# Patient Record
Sex: MALE | Race: WHITE | NOT HISPANIC OR LATINO | Employment: OTHER | URBAN - NONMETROPOLITAN AREA
[De-identification: names, ages, dates, MRNs, and addresses within clinical notes are randomized per-mention and may not be internally consistent; named-entity substitution may affect disease eponyms.]

---

## 2024-02-16 ENCOUNTER — EVALUATION (OUTPATIENT)
Dept: PHYSICAL THERAPY | Facility: CLINIC | Age: 74
End: 2024-02-16
Payer: MEDICARE

## 2024-02-16 DIAGNOSIS — M25.551 HIP PAIN, ACUTE, RIGHT: Primary | ICD-10-CM

## 2024-02-16 DIAGNOSIS — M25.551 RIGHT HIP PAIN: ICD-10-CM

## 2024-02-16 PROCEDURE — 97110 THERAPEUTIC EXERCISES: CPT | Mod: GP | Performed by: PHYSICAL THERAPIST

## 2024-02-16 PROCEDURE — 97161 PT EVAL LOW COMPLEX 20 MIN: CPT | Mod: GP | Performed by: PHYSICAL THERAPIST

## 2024-02-16 ASSESSMENT — ENCOUNTER SYMPTOMS
DEPRESSION: 0
OCCASIONAL FEELINGS OF UNSTEADINESS: 0
LOSS OF SENSATION IN FEET: 0

## 2024-02-16 ASSESSMENT — PAIN SCALES - GENERAL: PAINLEVEL_OUTOF10: 6

## 2024-02-16 ASSESSMENT — PATIENT HEALTH QUESTIONNAIRE - PHQ9
SUM OF ALL RESPONSES TO PHQ9 QUESTIONS 1 AND 2: 0
1. LITTLE INTEREST OR PLEASURE IN DOING THINGS: NOT AT ALL
2. FEELING DOWN, DEPRESSED OR HOPELESS: NOT AT ALL

## 2024-02-16 ASSESSMENT — PAIN - FUNCTIONAL ASSESSMENT: PAIN_FUNCTIONAL_ASSESSMENT: 0-10

## 2024-02-16 NOTE — PROGRESS NOTES
Physical Therapy Evaluation and Treatment      Patient Name: Matt Lantigua  MRN: [id number]  Today's Date: 2/16/2024  Time Calculation  Start Time: 1000  Stop Time: 1040  Time Calculation (min): 40 min    PT Evaluation Time Entry  PT Evaluation (Low) Time Entry: 29   PT Therapeutic Procedures Time Entry  Therapeutic Exercise Time Entry: 10                         Assessment:  PT Assessment Results: Decreased strength, Decreased range of motion, Decreased mobility, Pain  Rehab Prognosis: Good  Barriers to Participation:  (None)    The pt presents with Medical Dx of R hip pain.   Pt presents with the following deficits: increased pain, decreased strength, ROM, flexibility, balance, gait and functional mobility.  Pt would benefit from PT services in order to improve on these deficits and maximize strength and ability for functional activity/mobility.        Plan:  Treatment/Interventions: Cryotherapy, Education/ Instruction, Hot pack, Manual therapy, Neuromuscular re-education, Therapeutic exercises  PT Plan: Skilled PT  PT Frequency:  (2x/wk for 4 weeks or 9 sessions)  Rehab Potential: Good  Plan of Care Agreement: Patient (Patient Goals:  Improve strength and ROM leading up to upcoming R hip surgery)    Current Problem:   Problem List Items Addressed This Visit             ICD-10-CM       Musculoskeletal and Injuries    Right hip pain M25.551    Relevant Orders    Follow Up In Physical Therapy    Hip pain, acute, right - Primary M25.551    Relevant Orders    Follow Up In Physical Therapy       Subjective   Pt has chronic hx of R hip pain and is to have upcoming R THR surgery in the near future.  Pt reports that R hip pain is worse with walking longer distances, pivoting or twisting on the R hip and getting up from a chair.  Pt reports that sitting and rest help with pain.  Medication or ice/heat does not really help.      General  Reason for Referral: R  hip  Referred By: Macrina Mills CNP  General Comment:  Visit #     Precautions:  Precautions  Precautions Comment: No Fall risk.  PMH:  hx of chext pain, MI, HTN, OA, hx of lung sx and cholecystectomy    Pain:  Pain Assessment  Pain Assessment: 0-10  Pain Score: 6 (R hip pain range 2-9/10)  Pain Type: Chronic pain  Pain Location: Hip  Pain Orientation: Right    Home Living:   Pt lives with his wife in a 1 story home.       Prior Level of Function:   Pt was I with all ADL's and IADL's prior.   Retired.        Objective   Observation:   Mild FHP with rounded shoulders.    Gait:  Antalgic gait with mildly slower lacie.      Palpation:   Tender R hip/greater trochanter.    Strength:                      R-   Hip flex   4-/5   Hip abd   4-/5    Hip ER   3+/5   Hip IR   3+/5   Knee flex   5/5   Knee ext   4-/5     L-   Hip flex   5/5   Hip abd   5/5    Hip ER   5/5   Hip IR   5/5   Knee flex   5/5   Knee ext   5/5    Hip PROM:                    R-   WNL Throughout.      L-   Hip flex 90  deg    Hip abd 30  deg    Hip ER 10  deg   Hip IR 10  deg    Flexibility:   Moderate tightness R HS, glut and piriformis muscles.                                                               Special Tests:  N/A    Outcome Measures:  Other Measures  Lower Extremity Funtional Score (LEFS): 66%     Treatments:  Ther Ex:  10 min   1 unit  Manual L  HS Stretch   20 sec hold x 3   PROM L Hip all planes  7 min  HEP instruction verbal on stretches.       OP EDUCATION:   PT edu pt regarding PT POC/course of treatment and HEP.  Pt verbalized good understanding.      Goals:  Active       PT Problem       PT Goal 1       Start:  24    Expected End:  24       LT) Improve R hip strength  to >= 4+/5 throughout in order to facilitate safe gait and mobility.       4-6 weeks      2) Improve R hip PROM   to >= 105 deg flex and 20 deg ER and IR in order to facilitate safe gait and stair negotiation.       4-6 weeks        3) Improve  hip pain from   9/10 to <= 3/10 with activity in  order to improve QOL.      4-6 weeks      4) Improve LEFS score by >= 5 points in order to improve QOL.    4-6 weeks      5) Pt will be able to walk longer distances, transition sit to stand from chair, negotiate stairs and return to exercise, or work around the home without significant limitation.      4-6 weeks      ST) Pt/caregiver will be I and consistent with HEP with use of handout as needed in order to maximize strength and flexibility.      2-3 weeks

## 2024-03-01 ENCOUNTER — TREATMENT (OUTPATIENT)
Dept: PHYSICAL THERAPY | Facility: CLINIC | Age: 74
End: 2024-03-01
Payer: MEDICARE

## 2024-03-01 DIAGNOSIS — M25.551 RIGHT HIP PAIN: ICD-10-CM

## 2024-03-01 DIAGNOSIS — M25.551 HIP PAIN, ACUTE, RIGHT: ICD-10-CM

## 2024-03-01 PROCEDURE — 97110 THERAPEUTIC EXERCISES: CPT | Mod: GP,CQ

## 2024-03-01 ASSESSMENT — PAIN - FUNCTIONAL ASSESSMENT: PAIN_FUNCTIONAL_ASSESSMENT: 0-10

## 2024-03-01 ASSESSMENT — PAIN SCALES - GENERAL: PAINLEVEL_OUTOF10: 4

## 2024-03-01 NOTE — PROGRESS NOTES
Physical Therapy Treatment    Patient Name: Matt Lantigua  MRN: [id number]  Today's Date: 3/1/2024  Time Calculation  Start Time: 0735  Stop Time: 0819  Time Calculation (min): 44 min  PT Therapeutic Procedures Time Entry  Therapeutic Exercise Time Entry: 42       Assessment:   Patient identified by name and date of birth. Patient was able to tolerate progression of strengthening with mild discomfort noted throughout. He required minimal cues for form and technique. Added seated SLR for increased I with SLR with cues to maintain quad set he was able to preform with mild difficulty.     Plan:  OP PT Plan  Treatment/Interventions: Cryotherapy, Education/ Instruction, Hot pack, Manual therapy, Neuromuscular re-education, Therapeutic exercises  PT Plan: Skilled PT  PT Frequency:  (2x/wk for 4 weeks or 9 sessions)  Rehab Potential: Good  Plan of Care Agreement: Patient (Patient Goals:  Improve strength and ROM leading up to upcoming R hip surgery)  Continue with progression of strengthening and ROM for increased ease with ambulation.   Current Problem  Problem List Items Addressed This Visit             ICD-10-CM    Right hip pain M25.551    Hip pain, acute, right M25.551       Subjective Patient reported compliance with % of the time and verbalized understanding. He reported he required the assist of his wife to perform supine SLR. He reported 2.5-3/10 pain after treatment.     General  Reason for Referral: R  hip  Referred By: Macrina Mills CNP  General Comment: Visit # 2/9    Precautions  Precautions  Precautions Comment: No Fall risk.  PMH:  hx of chext pain, MI, HTN, OA, hx of lung sx and cholecystectomy    Pain  Pain Assessment: 0-10  Pain Score: 4  Pain Type: Chronic pain  Pain Location: Hip  Pain Orientation: Right     Treatments:  Therapeutic Exercise  Therapeutic Exercise Performed: Yes  Seated stepper 5'   Slant board 2 x 1'  Standing hip abd 2 x 10  Standing ext 2 x 10  Standing hip flexion 2 x  "10  Standing heel raises 2 x 10   Standing toe raises 2 x 10   Mini Squats 2 x 10   Seated marching 2 x 10   Seated hip abd 2 x 10 Green band   Seated hip add 2 x 10 3\" hold   LAQ 2 x 10 3\" hold   HSC 2 x 10 Green   Seated SLR 2 x 10   Heel slide with strap x10      OP EDUCATION:   Access Code: [email or web address]  URL: [email or web address]  Date: 2024  Prepared by: Lesia Barros    Exercises  - Supine Bridge  - 1 x daily - 7 x weekly - 2 sets - 10 reps - 3-5 hold  - Supine Heel Slide with Strap  - 1 x daily - 7 x weekly - 1 sets - 10 reps  - Standing Hip Abduction with Counter Support  - 1 x daily - 7 x weekly - 2 sets - 10 reps  - Standing Hip Extension with Counter Support  - 1 x daily - 7 x weekly - 2 sets - 10 reps  - Standing Hip Flexion AROM  - 1 x daily - 7 x weekly - 2 sets - 10 reps  - Mini Squat with Counter Support  - 1 x daily - 7 x weekly - 2 sets - 10 reps  - Heel Raises with Counter Support  - 1 x daily - 7 x weekly - 2 sets - 10 reps  - Standing Toe Raises at Chair  - 1 x daily - 7 x weekly - 2 sets - 10 reps  - Seated Hip Abduction with Resistance  - 1 x daily - 7 x weekly - 2 sets - 10 reps  - Seated Long Arc Quad  - 1 x daily - 7 x weekly - 2 sets - 10 reps - 3-5 hold  - Seated Hip Adduction Isometrics with Ball  - 1 x daily - 7 x weekly - 2 sets - 10 reps - 3-5 hold    Goals:  Active       PT Problem       PT Goal 1       Start:  24    Expected End:  24       LT) Improve R hip strength  to >= 4+/5 throughout in order to facilitate safe gait and mobility.       4-6 weeks      2) Improve R hip PROM   to >= 105 deg flex and 20 deg ER and IR in order to facilitate safe gait and stair negotiation.       4-6 weeks        3) Improve  hip pain from   9/10 to <= 3/10 with activity in order to improve QOL.      4-6 weeks      4) Improve LEFS score by >= 5 points in order to improve QOL.    4-6 weeks      5) Pt will be able to walk longer distances, transition sit to " stand from chair, negotiate stairs and return to exercise, or work around the home without significant limitation.      4-6 weeks      ST) Pt/caregiver will be I and consistent with HEP with use of handout as needed in order to maximize strength and flexibility.      2-3 weeks

## 2024-03-06 ENCOUNTER — TREATMENT (OUTPATIENT)
Dept: PHYSICAL THERAPY | Facility: CLINIC | Age: 74
End: 2024-03-06
Payer: MEDICARE

## 2024-03-06 DIAGNOSIS — M25.551 RIGHT HIP PAIN: ICD-10-CM

## 2024-03-06 DIAGNOSIS — M25.551 HIP PAIN, ACUTE, RIGHT: ICD-10-CM

## 2024-03-06 PROCEDURE — 97110 THERAPEUTIC EXERCISES: CPT | Mod: GP,CQ

## 2024-03-06 ASSESSMENT — PAIN - FUNCTIONAL ASSESSMENT: PAIN_FUNCTIONAL_ASSESSMENT: 0-10

## 2024-03-06 ASSESSMENT — PAIN SCALES - GENERAL: PAINLEVEL_OUTOF10: 3

## 2024-03-06 NOTE — PROGRESS NOTES
Physical Therapy Treatment    Patient Name: Matt Lantigua  MRN: [id number]  Today's Date: 3/6/2024  Time Calculation  Start Time: 1048  Stop Time: 1130  Time Calculation (min): 42 min  PT Therapeutic Procedures Time Entry  Therapeutic Exercise Time Entry: 40       Assessment:   Patient identified by name and date of birth. Patient demonstrated difficulty with wb on R LE this date. Reviewed preforming exercises in small range to decrease with reports of increased pain/soreness.     Plan:  OP PT Plan  Treatment/Interventions: Cryotherapy, Education/ Instruction, Hot pack, Manual therapy, Neuromuscular re-education, Therapeutic exercises  PT Plan: Skilled PT  PT Frequency:  (2x/wk for 4 weeks or 9 sessions)  Rehab Potential: Good  Plan of Care Agreement: Patient (Patient Goals:  Improve strength and ROM leading up to upcoming R hip surgery)  Continue with ROM and strengthening for reduction of Sx and prep for upcoming surgery.   Current Problem  Problem List Items Addressed This Visit             ICD-10-CM    Right hip pain M25.551    Hip pain, acute, right M25.551       Subjective Patient reported compliance with % of the time and verbalized understanding.  He reported increased pain and soreness since last treatment. He reported 2/10 pain after treatment.  General  Reason for Referral: R  hip  Referred By: Macrina Mills CNP  General Comment: Visit # 3/9  Precautions  Precautions  Precautions Comment: No Fall risk.  PMH:  hx of chext pain, MI, HTN, OA, hx of lung sx and cholecystectomy    Pain  Pain Assessment: 0-10  Pain Score: 3  Pain Type: Chronic pain  Pain Location: Hip  Pain Orientation: Right     Treatments:  Therapeutic Exercise  Therapeutic Exercise Performed: Yes  Slant board 2 x 1'  Step up (A)  Standing hip abd 2 x 10  Standing ext 2 x 10  Standing hip flexion 2 x 10  Standing heel raises 2 x 10   Standing toe raises 2 x 10   Mini Squats 2 x 10   Seated marching  x 10   Seated hip abd 2 x 10  "Green band   Seated hip add 2 x 10 3\" hold   LAQ 2 x 10 3\" hold   HSC 2 x 10 Green   Seated SLR 2 x 10 (supine this date with Nova)  SAQ 2 x 10 3\" hold  Hip flex stretch (A)  Heel slide with strap x10     OP EDUCATION:    Access Code: [email or web address]  URL: [email or web address]  Date: 2024  Prepared by: Lesia Barros     Exercises  - Supine Bridge  - 1 x daily - 7 x weekly - 2 sets - 10 reps - 3-5 hold  - Supine Heel Slide with Strap  - 1 x daily - 7 x weekly - 1 sets - 10 reps  - Standing Hip Abduction with Counter Support  - 1 x daily - 7 x weekly - 2 sets - 10 reps  - Standing Hip Extension with Counter Support  - 1 x daily - 7 x weekly - 2 sets - 10 reps  - Standing Hip Flexion AROM  - 1 x daily - 7 x weekly - 2 sets - 10 reps  - Mini Squat with Counter Support  - 1 x daily - 7 x weekly - 2 sets - 10 reps  - Heel Raises with Counter Support  - 1 x daily - 7 x weekly - 2 sets - 10 reps  - Standing Toe Raises at Chair  - 1 x daily - 7 x weekly - 2 sets - 10 reps  - Seated Hip Abduction with Resistance  - 1 x daily - 7 x weekly - 2 sets - 10 reps  - Seated Long Arc Quad  - 1 x daily - 7 x weekly - 2 sets - 10 reps - 3-5 hold  - Seated Hip Adduction Isometrics with Ball  - 1 x daily - 7 x weekly - 2 sets - 10 reps - 3-5 hold    Goals:  Active       PT Problem       PT Goal 1       Start:  24    Expected End:  24       LT) Improve R hip strength  to >= 4+/5 throughout in order to facilitate safe gait and mobility.       4-6 weeks      2) Improve R hip PROM   to >= 105 deg flex and 20 deg ER and IR in order to facilitate safe gait and stair negotiation.       4-6 weeks        3) Improve  hip pain from   9/10 to <= 3/10 with activity in order to improve QOL.      4-6 weeks      4) Improve LEFS score by >= 5 points in order to improve QOL.    4-6 weeks      5) Pt will be able to walk longer distances, transition sit to stand from chair, negotiate stairs and return to exercise, " or work around the home without significant limitation.      4-6 weeks      ST) Pt/caregiver will be I and consistent with HEP with use of handout as needed in order to maximize strength and flexibility.      2-3 weeks

## 2024-03-08 ENCOUNTER — TREATMENT (OUTPATIENT)
Dept: PHYSICAL THERAPY | Facility: CLINIC | Age: 74
End: 2024-03-08
Payer: MEDICARE

## 2024-03-08 DIAGNOSIS — M25.551 HIP PAIN, ACUTE, RIGHT: ICD-10-CM

## 2024-03-08 DIAGNOSIS — M25.551 RIGHT HIP PAIN: ICD-10-CM

## 2024-03-08 PROCEDURE — 97110 THERAPEUTIC EXERCISES: CPT | Mod: GP,CQ

## 2024-03-08 PROCEDURE — 97140 MANUAL THERAPY 1/> REGIONS: CPT | Mod: GP,CQ

## 2024-03-08 ASSESSMENT — PAIN - FUNCTIONAL ASSESSMENT: PAIN_FUNCTIONAL_ASSESSMENT: 0-10

## 2024-03-08 ASSESSMENT — PAIN SCALES - GENERAL: PAINLEVEL_OUTOF10: 3

## 2024-03-13 ENCOUNTER — TREATMENT (OUTPATIENT)
Dept: PHYSICAL THERAPY | Facility: CLINIC | Age: 74
End: 2024-03-13
Payer: MEDICARE

## 2024-03-13 DIAGNOSIS — M25.551 RIGHT HIP PAIN: ICD-10-CM

## 2024-03-13 DIAGNOSIS — M25.551 HIP PAIN, ACUTE, RIGHT: ICD-10-CM

## 2024-03-13 PROCEDURE — 97140 MANUAL THERAPY 1/> REGIONS: CPT | Mod: GP,CQ

## 2024-03-13 PROCEDURE — 97110 THERAPEUTIC EXERCISES: CPT | Mod: GP,CQ

## 2024-03-13 ASSESSMENT — PAIN - FUNCTIONAL ASSESSMENT: PAIN_FUNCTIONAL_ASSESSMENT: 0-10

## 2024-03-13 ASSESSMENT — PAIN SCALES - GENERAL: PAINLEVEL_OUTOF10: 2

## 2024-03-13 NOTE — PROGRESS NOTES
"Physical Therapy Treatment    Patient Name: Matt Lantigua  MRN: [id number]  Today's Date: 3/13/2024  Time Calculation  Start Time: 1045  Stop Time: 1132  Time Calculation (min): 47 min  PT Therapeutic Procedures Time Entry  Manual Therapy Time Entry: 10  Therapeutic Exercise Time Entry: 34       Assessment:   Patient identified by name and date of birth.  Patient continues to demonstrate difficulty with wb on R LE. He was able to progress with surface with some exercise w/o increased Sx.  Patient demonstrated increased tension in IT, and iliopsoas release.      Plan:  OP PT Plan  Treatment/Interventions: Cryotherapy, Education/ Instruction, Hot pack, Manual therapy, Neuromuscular re-education, Therapeutic exercises  PT Plan: Skilled PT  PT Frequency:  (2x/wk for 4 weeks or 9 sessions)  Rehab Potential: Good  Plan of Care Agreement: Patient (Patient Goals:  Improve strength and ROM leading up to upcoming R hip surgery)  Continue with progression of strengthening and ROM with manual as needed for reduction of Sx and increased ease with ambulation.   Current Problem  Problem List Items Addressed This Visit             ICD-10-CM    Right hip pain M25.551    Hip pain, acute, right M25.551       Subjective Patient reported compliance with % of the time and verbalized understanding.  He reported his Sx increase each day, he reported he experienced SI pain this AM. He reported 0/10 pain after treatment.   General  Reason for Referral: R  hip  Referred By: Macrina Mills CNP  General Comment: Visit # 5/9  Precautions  Precautions  Precautions Comment: No Fall risk.  PMH:  hx of chext pain, MI, HTN, OA, hx of lung sx and cholecystectomy    Pain  Pain Assessment: 0-10  Pain Score: 2  Pain Type: Chronic pain  Pain Location: Hip  Pain Orientation: Right     Treatments:  Therapeutic Exercise  Therapeutic Exercise Performed: Yes  Seated stepper 6' level 2 (N)  Slant board 2 x 1'  Step up 6\" x10 (N)  Standing hip abd 2 " "x 10 with R only   Standing ext 2 x 10 with R only   Standing hip flexion 2 x 10 with R only   Standing heel raises 2 x 10 on Airex (P)  Standing toe raises 2 x 10 on Airex (P)  Mini Squats 2 x 10   Seated marching  2 x 10   Seated hip abd 2 x 10 Blue band (P)  Seated hip add 2 x 10 3\" hold   LAQ 2 x 10 3\" hold   HSC 2 x 10 Blue (P)  Seated SLR 2 x 10   SAQ 2 x 10 3\" hold  Hip flex stretch 3 x 20\" (N)  Heel slide with strap x10 (manual)    Manual Therapy  Manual Therapy Performed: Yes   STW to hip flexors and glut med and PROM completed    Iliopsoas release     OP EDUCATION:   Access Code: [email or web address]  URL: [email or web address]  Date: 2024  Prepared by: Lesia Barros     Exercises  - Supine Bridge  - 1 x daily - 7 x weekly - 2 sets - 10 reps - 3-5 hold  - Supine Heel Slide with Strap  - 1 x daily - 7 x weekly - 1 sets - 10 reps  - Standing Hip Abduction with Counter Support  - 1 x daily - 7 x weekly - 2 sets - 10 reps  - Standing Hip Extension with Counter Support  - 1 x daily - 7 x weekly - 2 sets - 10 reps  - Standing Hip Flexion AROM  - 1 x daily - 7 x weekly - 2 sets - 10 reps  - Mini Squat with Counter Support  - 1 x daily - 7 x weekly - 2 sets - 10 reps  - Heel Raises with Counter Support  - 1 x daily - 7 x weekly - 2 sets - 10 reps  - Standing Toe Raises at Chair  - 1 x daily - 7 x weekly - 2 sets - 10 reps  - Seated Hip Abduction with Resistance  - 1 x daily - 7 x weekly - 2 sets - 10 reps  - Seated Long Arc Quad  - 1 x daily - 7 x weekly - 2 sets - 10 reps - 3-5 hold  - Seated Hip Adduction Isometrics with Ball  - 1 x daily - 7 x weekly - 2 sets - 10 reps - 3-5 hold       Goals:  Active       PT Problem       PT Goal 1       Start:  24    Expected End:  24       LT) Improve R hip strength  to >= 4+/5 throughout in order to facilitate safe gait and mobility.       4-6 weeks      2) Improve R hip PROM   to >= 105 deg flex and 20 deg ER and IR in order to " facilitate safe gait and stair negotiation.       4-6 weeks        3) Improve  hip pain from   9/10 to <= 3/10 with activity in order to improve QOL.      4-6 weeks      4) Improve LEFS score by >= 5 points in order to improve QOL.    4-6 weeks      5) Pt will be able to walk longer distances, transition sit to stand from chair, negotiate stairs and return to exercise, or work around the home without significant limitation.      4-6 weeks      ST) Pt/caregiver will be I and consistent with HEP with use of handout as needed in order to maximize strength and flexibility.      2-3 weeks

## 2024-03-15 ENCOUNTER — CLINICAL SUPPORT (OUTPATIENT)
Dept: PHYSICAL THERAPY | Facility: CLINIC | Age: 74
End: 2024-03-15
Payer: MEDICARE

## 2024-03-15 DIAGNOSIS — M25.551 HIP PAIN, ACUTE, RIGHT: ICD-10-CM

## 2024-03-15 DIAGNOSIS — M25.551 RIGHT HIP PAIN: ICD-10-CM

## 2024-03-15 PROCEDURE — 97140 MANUAL THERAPY 1/> REGIONS: CPT | Mod: GP,CQ

## 2024-03-15 PROCEDURE — 97110 THERAPEUTIC EXERCISES: CPT | Mod: GP,CQ

## 2024-03-15 ASSESSMENT — PAIN - FUNCTIONAL ASSESSMENT: PAIN_FUNCTIONAL_ASSESSMENT: 0-10

## 2024-03-15 ASSESSMENT — PAIN SCALES - GENERAL: PAINLEVEL_OUTOF10: 4

## 2024-03-15 NOTE — PROGRESS NOTES
"Physical Therapy Treatment    Patient Name: Matt Lantigua  MRN: [id number]  Today's Date: 3/15/2024                       General:       Assessment:       Plan:       Current Problem  Problem List Items Addressed This Visit    None      Subjective     Precautions       Pain       Treatments:  Therapeutic Exercise  Therapeutic Exercise Performed: Yes  Seated stepper 6' level 2 (N)  Slant board 2 x 1'  Step up 6\" x10 (N)  Standing hip abd 2 x 10 with R only   Standing ext 2 x 10 with R only   Standing hip flexion 2 x 10 with R only   Standing heel raises 2 x 10 on Airex (P)  Standing toe raises 2 x 10 on Airex (P)  Mini Squats 2 x 10   Seated marching  2 x 10   Seated hip abd 2 x 10 Blue band (P)  Seated hip add 2 x 10 3\" hold   LAQ 2 x 10 3\" hold   HSC 2 x 10 Blue (P)  Seated SLR 2 x 10   SAQ 2 x 10 3\" hold  Hip flex stretch 3 x 20\" (N)  Heel slide with strap x10 (manual)     Manual Therapy  Manual Therapy Performed: Yes   STW to hip flexors and glut med and PROM completed    Iliopsoas release        OP EDUCATION:   Access Code: [email or web address]  URL: [email or web address]  Date: 03/01/2024  Prepared by: Lesia Barros     Exercises  - Supine Bridge  - 1 x daily - 7 x weekly - 2 sets - 10 reps - 3-5 hold  - Supine Heel Slide with Strap  - 1 x daily - 7 x weekly - 1 sets - 10 reps  - Standing Hip Abduction with Counter Support  - 1 x daily - 7 x weekly - 2 sets - 10 reps  - Standing Hip Extension with Counter Support  - 1 x daily - 7 x weekly - 2 sets - 10 reps  - Standing Hip Flexion AROM  - 1 x daily - 7 x weekly - 2 sets - 10 reps  - Mini Squat with Counter Support  - 1 x daily - 7 x weekly - 2 sets - 10 reps  - Heel Raises with Counter Support  - 1 x daily - 7 x weekly - 2 sets - 10 reps  - Standing Toe Raises at Chair  - 1 x daily - 7 x weekly - 2 sets - 10 reps  - Seated Hip Abduction with Resistance  - 1 x daily - 7 x weekly - 2 sets - 10 reps  - Seated Long Arc Quad  - 1 x daily - 7 x weekly - " 2 sets - 10 reps - 3-5 hold  - Seated Hip Adduction Isometrics with Ball  - 1 x daily - 7 x weekly - 2 sets - 10 reps - 3-5 hold    Goals:  Active       PT Problem       PT Goal 1       Start:  24    Expected End:  24       LT) Improve R hip strength  to >= 4+/5 throughout in order to facilitate safe gait and mobility.       4-6 weeks      2) Improve R hip PROM   to >= 105 deg flex and 20 deg ER and IR in order to facilitate safe gait and stair negotiation.       4-6 weeks        3) Improve  hip pain from   9/10 to <= 3/10 with activity in order to improve QOL.      4-6 weeks      4) Improve LEFS score by >= 5 points in order to improve QOL.    4-6 weeks      5) Pt will be able to walk longer distances, transition sit to stand from chair, negotiate stairs and return to exercise, or work around the home without significant limitation.      4-6 weeks      ST) Pt/caregiver will be I and consistent with HEP with use of handout as needed in order to maximize strength and flexibility.      2-3 weeks

## 2024-03-15 NOTE — PROGRESS NOTES
Physical Therapy Treatment    Patient Name: Matt Lantigua  MRN: [id number]  Today's Date: 3/15/2024  Time Calculation  Start Time: 1045  Stop Time: 1128  Time Calculation (min): 43 min  PT Therapeutic Procedures Time Entry  Manual Therapy Time Entry: 11  Therapeutic Exercise Time Entry: 30       Assessment:   Patient identified by name and date of birth. Patient demonstrated fair to good tolerance with treatment. He continues to experience increased Sx with WB on R therefore held single LE wb on R. He demonstrated muscle tension in hip flexor and IT band that responded well to STW.     Plan:  OP PT Plan  Treatment/Interventions: Cryotherapy, Education/ Instruction, Hot pack, Manual therapy, Neuromuscular re-education, Therapeutic exercises  PT Plan: Skilled PT  PT Frequency:  (2x/wk for 4 weeks or 9 sessions)  Rehab Potential: Good  Plan of Care Agreement: Patient (Patient Goals:  Improve strength and ROM leading up to upcoming R hip surgery)  Continue with progression of strengthening and ROM for increased ease with ambulation and upcomming surgery.   Current Problem  Problem List Items Addressed This Visit             ICD-10-CM    Right hip pain M25.551    Hip pain, acute, right M25.551       Subjective Patient reported compliance with % of the time and verbalized understanding.  Patient reported he tripped walking into his home two days prior and has experienced increased soreness after, he reported he has not utilized any ice.  He reported 1-2/10 pain after treatment.   General  Reason for Referral: R  hip  Referred By: Macrina Mills CNP  General Comment: Visit # 6/9  Precautions  Precautions  Precautions Comment: No Fall risk.  PMH:  hx of chext pain, MI, HTN, OA, hx of lung sx and cholecystectomy    Pain  Pain Assessment: 0-10  Pain Score: 4  Pain Type: Chronic pain  Pain Location: Hip  Pain Orientation: Right     Treatments:  Therapeutic Exercise  Therapeutic Exercise Performed: Yes   Seated  "stepper 6' level 2 (N)  Slant board 2 x 1'  Step up 6\" x10 (N)  Standing hip abd 2 x 10 with R only   Standing ext 2 x 10 with R only   Standing hip flexion x25 with R only   Standing heel raises x25 on Airex (P)  Standing toe raises x25 on Airex (P)  Mini Squats x25  Standing marching 2 x 10   Seated marching x25  Static stance on NBOS 3 x 20\" (N)  Tandem stance 2 x 20\" on floor (N)  Seated hip abd 2 x 10 Blue band   Seated hip add 2 x 10 3\" hold   LAQ 2 x 10 3\" hold   HSC 2 x 10 Blue  Seated SLR 2 x 10   SAQ 2 x 10 3\" hold  Hip flex stretch 3 x 20\" (N)  Heel slide with strap x10 (manual)     Manual Therapy  Manual Therapy Performed: Yes   STW to hip flexors and glut med and PROM completed    Iliopsoas release      OP EDUCATION:   Access Code: [email or web address]  URL: [email or web address]  Date: 2024  Prepared by: Lesia Barros     Exercises  - Supine Bridge  - 1 x daily - 7 x weekly - 2 sets - 10 reps - 3-5 hold  - Supine Heel Slide with Strap  - 1 x daily - 7 x weekly - 1 sets - 10 reps  - Standing Hip Abduction with Counter Support  - 1 x daily - 7 x weekly - 2 sets - 10 reps  - Standing Hip Extension with Counter Support  - 1 x daily - 7 x weekly - 2 sets - 10 reps  - Standing Hip Flexion AROM  - 1 x daily - 7 x weekly - 2 sets - 10 reps  - Mini Squat with Counter Support  - 1 x daily - 7 x weekly - 2 sets - 10 reps  - Heel Raises with Counter Support  - 1 x daily - 7 x weekly - 2 sets - 10 reps  - Standing Toe Raises at Chair  - 1 x daily - 7 x weekly - 2 sets - 10 reps  - Seated Hip Abduction with Resistance  - 1 x daily - 7 x weekly - 2 sets - 10 reps  - Seated Long Arc Quad  - 1 x daily - 7 x weekly - 2 sets - 10 reps - 3-5 hold  - Seated Hip Adduction Isometrics with Ball  - 1 x daily - 7 x weekly - 2 sets - 10 reps - 3-5 hold        Goals:  Active       PT Problem       PT Goal 1       Start:  02/16/24    Expected End:  24       LT) Improve R hip strength  to >= 4+/5 " throughout in order to facilitate safe gait and mobility.       4-6 weeks      2) Improve R hip PROM   to >= 105 deg flex and 20 deg ER and IR in order to facilitate safe gait and stair negotiation.       4-6 weeks        3) Improve  hip pain from   9/10 to <= 3/10 with activity in order to improve QOL.      4-6 weeks      4) Improve LEFS score by >= 5 points in order to improve QOL.    4-6 weeks      5) Pt will be able to walk longer distances, transition sit to stand from chair, negotiate stairs and return to exercise, or work around the home without significant limitation.      4-6 weeks      ST) Pt/caregiver will be I and consistent with HEP with use of handout as needed in order to maximize strength and flexibility.      2-3 weeks

## 2024-03-20 ENCOUNTER — TREATMENT (OUTPATIENT)
Dept: PHYSICAL THERAPY | Facility: CLINIC | Age: 74
End: 2024-03-20
Payer: MEDICARE

## 2024-03-20 DIAGNOSIS — M25.551 HIP PAIN, ACUTE, RIGHT: ICD-10-CM

## 2024-03-20 DIAGNOSIS — M25.551 RIGHT HIP PAIN: ICD-10-CM

## 2024-03-20 PROCEDURE — 97110 THERAPEUTIC EXERCISES: CPT | Mod: GP,CQ

## 2024-03-20 PROCEDURE — 97140 MANUAL THERAPY 1/> REGIONS: CPT | Mod: GP,CQ

## 2024-03-20 ASSESSMENT — PAIN - FUNCTIONAL ASSESSMENT: PAIN_FUNCTIONAL_ASSESSMENT: 0-10

## 2024-03-20 ASSESSMENT — PAIN SCALES - GENERAL: PAINLEVEL_OUTOF10: 3

## 2024-03-20 NOTE — PROGRESS NOTES
"Physical Therapy Treatment    Patient Name: Matt Lantigua  MRN: [id number]  Today's Date: 3/20/2024  Time Calculation  Start Time: 1047  Stop Time: 1130  Time Calculation (min): 43 min  PT Therapeutic Procedures Time Entry  Manual Therapy Time Entry: 11  Therapeutic Exercise Time Entry: 30     Assessment:   Patient identified by name and date of birth. Continued to hold WB on R due to report of increased Sx. He demonstrated good understanding of HEP and exercises with minimal cues required. He demonstrated farcical grimacing with PROM.     Plan:  OP PT Plan  Treatment/Interventions: Cryotherapy, Education/ Instruction, Hot pack, Manual therapy, Neuromuscular re-education, Therapeutic exercises  PT Plan: Skilled PT  PT Frequency:  (2x/wk for 4 weeks or 9 sessions)  Rehab Potential: Good  Plan of Care Agreement: Patient (Patient Goals:  Improve strength and ROM leading up to upcoming R hip surgery)  Patient has Re-check with PT next visit.   Current Problem  Problem List Items Addressed This Visit             ICD-10-CM    Right hip pain M25.551    Hip pain, acute, right M25.551       Subjective Patient reported compliance with  % of the time and verbalized understanding.  He reported no change in Sx after treatment.     General  Reason for Referral: R  hip  Referred By: Macrina Mills CNP  General Comment: Visit # 7/9  Precautions  Precautions  Precautions Comment: No Fall risk.  PMH:  hx of chext pain, MI, HTN, OA, hx of lung sx and cholecystectomy    Pain  Pain Assessment: 0-10  Pain Score: 3  Pain Type: Chronic pain  Pain Location: Hip  Pain Orientation: Right     Treatments:  Therapeutic Exercise  Therapeutic Exercise Performed: Yes  Seated stepper 6' level 2   Slant board 2 x 1'  Step up 6\" x10 (N)  Standing hip abd 2 x 10 with R only   Standing ext 2 x 10 with R only   Standing hip flexion x25 with R only   Standing heel raises x25 on Airex   Standing toe raises x25 on Airex   Mini Squats " "x25  Standing marching 2 x 10   Standing  marching x25 on Airex (P)  Static stance on NBOS 1 x 20\"  Tandem stance 2 x 20\" on floor (N)  Seated hip abd 2 x 10 Blue band   Seated hip add 2 x 10 3\" hold   LAQ 2 x 10 3\" hold   HSC 2 x 10 Blue  Seated SLR 2 x 10   SAQ 2 x 10 3\" hold   Hip flex stretch 3 x 20\" (N)  Heel slide with strap x10 (manual)    Manual Therapy  Manual Therapy Performed: Yes    STW to hip flexors and glut med and PROM completed    Iliopsoas release     OP EDUCATION:    Access Code: [email or web address]  URL: [email or web address]  Date: 2024  Prepared by: Lesia Barros     Exercises  - Supine Bridge  - 1 x daily - 7 x weekly - 2 sets - 10 reps - 3-5 hold  - Supine Heel Slide with Strap  - 1 x daily - 7 x weekly - 1 sets - 10 reps  - Standing Hip Abduction with Counter Support  - 1 x daily - 7 x weekly - 2 sets - 10 reps  - Standing Hip Extension with Counter Support  - 1 x daily - 7 x weekly - 2 sets - 10 reps  - Standing Hip Flexion AROM  - 1 x daily - 7 x weekly - 2 sets - 10 reps  - Mini Squat with Counter Support  - 1 x daily - 7 x weekly - 2 sets - 10 reps  - Heel Raises with Counter Support  - 1 x daily - 7 x weekly - 2 sets - 10 reps  - Standing Toe Raises at Chair  - 1 x daily - 7 x weekly - 2 sets - 10 reps  - Seated Hip Abduction with Resistance  - 1 x daily - 7 x weekly - 2 sets - 10 reps  - Seated Long Arc Quad  - 1 x daily - 7 x weekly - 2 sets - 10 reps - 3-5 hold  - Seated Hip Adduction Isometrics with Ball  - 1 x daily - 7 x weekly - 2 sets - 10 reps - 3-5 hold    Goals:  Active       PT Problem       PT Goal 1       Start:  24    Expected End:  24       LT) Improve R hip strength  to >= 4+/5 throughout in order to facilitate safe gait and mobility.       4-6 weeks      2) Improve R hip PROM   to >= 105 deg flex and 20 deg ER and IR in order to facilitate safe gait and stair negotiation.       4-6 weeks        3) Improve  hip pain from   9/10 to " <= 3/10 with activity in order to improve QOL.      4-6 weeks      4) Improve LEFS score by >= 5 points in order to improve QOL.    4-6 weeks      5) Pt will be able to walk longer distances, transition sit to stand from chair, negotiate stairs and return to exercise, or work around the home without significant limitation.      4-6 weeks      ST) Pt/caregiver will be I and consistent with HEP with use of handout as needed in order to maximize strength and flexibility.      2-3 weeks

## 2024-03-22 ENCOUNTER — TREATMENT (OUTPATIENT)
Dept: PHYSICAL THERAPY | Facility: CLINIC | Age: 74
End: 2024-03-22
Payer: MEDICARE

## 2024-03-22 DIAGNOSIS — M25.551 HIP PAIN, ACUTE, RIGHT: ICD-10-CM

## 2024-03-22 DIAGNOSIS — M25.551 RIGHT HIP PAIN: ICD-10-CM

## 2024-03-22 PROCEDURE — 97110 THERAPEUTIC EXERCISES: CPT | Mod: GP | Performed by: PHYSICAL THERAPIST

## 2024-03-22 ASSESSMENT — PAIN SCALES - GENERAL: PAINLEVEL_OUTOF10: 5 - MODERATE PAIN

## 2024-03-22 ASSESSMENT — PAIN - FUNCTIONAL ASSESSMENT: PAIN_FUNCTIONAL_ASSESSMENT: 0-10

## 2024-03-22 NOTE — PROGRESS NOTES
Physical Therapy Discharge/Treatment    Patient Name: Matt Lantigua  MRN: [id number]  Today's Date: 3/22/2024  Time Calculation  Start Time: 1115  Stop Time: 1142  Time Calculation (min): 27 min      PT Therapeutic Procedures Time Entry  Therapeutic Exercise Time Entry: 25                         General  Reason for Referral: R  hip  Referred By: Macrina Mills CNP  General Comment: Visit # 8    Assessment:   The pt has made objective progress in PT with improved R hip strength, ROM/flexibility.  The pt has MET or PARTIALLY MET most of his PT goals and is I with current HEP.  DC PT at this time.  Continue with HEP leading up to upcoming R THR surgery.       Plan:  OP PT Plan  Treatment/Interventions: Cryotherapy, Education/ Instruction, Hot pack, Manual therapy, Neuromuscular re-education, Therapeutic exercises  PT Plan: Skilled PT  PT Frequency:  No further visits.  DC.  Rehab Potential: Good  Plan of Care Agreement: Patient (Patient Goals:  Improve strength and ROM leading up to upcoming R hip surgery)    3/22/2024 PT DC Summary:  The pt has made objective progress in PT with improved R hip strength, ROM/flexibility.  The pt has MET or PARTIALLY MET most of his PT goals and is I with current HEP.  DC PT at this time.  Continue with HEP leading up to upcoming R THR surgery.       Current Problem  Problem List Items Addressed This Visit             ICD-10-CM       Musculoskeletal and Injuries    Right hip pain M25.551    Hip pain, acute, right M25.551       Subjective  Pt reports that his R hip is very sore and painful today.  Pt reports that he feels good about DC today and will keep up with his ex's after DC today.      Precautions  Precautions  Precautions Comment: No Fall risk.  PMH:  hx of chext pain, MI, HTN, OA, hx of lung sx and cholecystectomy    Pain  Pain Assessment: 0-10  Pain Score: 5 - Moderate pain  Pain Type: Chronic pain  Pain Location: Hip  Pain Orientation: Right      Treatments:  There Ex:   "25 min   2 units  Therapeutic Exercise  Therapeutic Exercise Performed: Yes  Seated stepper 6' level 2   Slant board 2 x 1'  Standing heel raises x25 on   Standing toe raises x25 on   Mini Squats x25  3/22/2024 PT DC Summary Completed Today.    Following Ex's X today:    Step up 6\" x10 X  Standing hip abd 2 x 10 with R only   Standing ext 2 x 10 with R only   Standing hip flexion x25 with R only   Standing heel raises x25 on   Standing toe raises x25 on   Mini Squats x25  Standing marching 2 x 10   Standing  marching x25 on Airex (P)  Static stance on NBOS 1 x 20\"  Tandem stance 2 x 20\" on floor (N)  Seated hip abd 2 x 10 Blue band   Seated hip add 2 x 10 3\" hold   LAQ 2 x 10 3\" hold   HSC 2 x 10 Blue  Seated SLR 2 x 10   SAQ 2 x 10 3\" hold   Hip flex stretch 3 x 20\" (N)  Heel slide with strap x10 (manual)     Manual Therapy  Manual Therapy Performed: Yes    STW to hip flexors and glut med and PROM completed    Iliopsoas release   OP EDUCATION:   Access Code: [email or web address]  URL: [email or web address]  Date: 03/01/2024  Prepared by: Lesia Barros     Exercises  - Supine Bridge  - 1 x daily - 7 x weekly - 2 sets - 10 reps - 3-5 hold  - Supine Heel Slide with Strap  - 1 x daily - 7 x weekly - 1 sets - 10 reps  - Standing Hip Abduction with Counter Support  - 1 x daily - 7 x weekly - 2 sets - 10 reps  - Standing Hip Extension with Counter Support  - 1 x daily - 7 x weekly - 2 sets - 10 reps  - Standing Hip Flexion AROM  - 1 x daily - 7 x weekly - 2 sets - 10 reps  - Mini Squat with Counter Support  - 1 x daily - 7 x weekly - 2 sets - 10 reps  - Heel Raises with Counter Support  - 1 x daily - 7 x weekly - 2 sets - 10 reps  - Standing Toe Raises at Chair  - 1 x daily - 7 x weekly - 2 sets - 10 reps  - Seated Hip Abduction with Resistance  - 1 x daily - 7 x weekly - 2 sets - 10 reps  - Seated Long Arc Quad  - 1 x daily - 7 x weekly - 2 sets - 10 reps - 3-5 hold  - Seated Hip Adduction Isometrics with " Ball  - 1 x daily - 7 x weekly - 2 sets - 10 reps - 3-5 hold       Goals:  Active       PT Problem       PT Goal 1       Start:  24    Expected End:  24       LT) Improve R hip strength  to >= 4+/5 throughout in order to facilitate safe gait and mobility.       4-6 weeks  3/22/2024, PARTIALLY MET, R hip flexion 4-/5 and abd/add 4 to 4+/5    2) Improve R hip PROM   to >= 105 deg flex and 20 deg ER and IR in order to facilitate safe gait and stair negotiation.       4-6 weeks    3/22/2024, PARTIALLY MET, R hip PROM 105 deg flex, 20 deg ER and 15 deg IR    3) Improve R hip pain from   9/10 to <= 3/10 with activity in order to improve QOL.      4-6 weeks  3/22/2024, PARTIALLY MET, R hip pain range 1-6/10 with activity    4) Improve LEFS score by >= 5 points in order to improve QOL.    4-6 weeks  3/22/2024, MET, pt scored a 30 at baseline and a 41 at DC.      5) Pt will be able to walk longer distances, transition sit to stand from chair, negotiate stairs and return to exercise, or work around the home without significant limitation.      4-6 weeks  3/22/2024, PARTIALLY MET, pt is able to do above activity a little better but still has significant limitation and reports that it has gotten worse the last week.      ST) Pt/caregiver will be I and consistent with HEP with use of handout as needed in order to maximize strength and flexibility.      2-3 weeks  3/22/2024, MET, pt is I with current HEP and has handouts.

## 2024-03-27 ENCOUNTER — APPOINTMENT (OUTPATIENT)
Dept: PHYSICAL THERAPY | Facility: CLINIC | Age: 74
End: 2024-03-27
Payer: MEDICARE